# Patient Record
Sex: MALE | Race: WHITE
[De-identification: names, ages, dates, MRNs, and addresses within clinical notes are randomized per-mention and may not be internally consistent; named-entity substitution may affect disease eponyms.]

---

## 2018-06-03 ENCOUNTER — HOSPITAL ENCOUNTER (EMERGENCY)
Dept: HOSPITAL 17 - PHED | Age: 33
Discharge: HOME | End: 2018-06-03
Payer: COMMERCIAL

## 2018-06-03 VITALS
RESPIRATION RATE: 16 BRPM | OXYGEN SATURATION: 100 % | HEART RATE: 72 BPM | DIASTOLIC BLOOD PRESSURE: 86 MMHG | SYSTOLIC BLOOD PRESSURE: 114 MMHG

## 2018-06-03 VITALS — WEIGHT: 143.08 LBS | HEIGHT: 71 IN | BODY MASS INDEX: 20.03 KG/M2

## 2018-06-03 VITALS
HEART RATE: 93 BPM | RESPIRATION RATE: 18 BRPM | SYSTOLIC BLOOD PRESSURE: 169 MMHG | DIASTOLIC BLOOD PRESSURE: 81 MMHG | OXYGEN SATURATION: 100 % | TEMPERATURE: 97.1 F

## 2018-06-03 VITALS
DIASTOLIC BLOOD PRESSURE: 74 MMHG | SYSTOLIC BLOOD PRESSURE: 93 MMHG | OXYGEN SATURATION: 100 % | RESPIRATION RATE: 18 BRPM | HEART RATE: 72 BPM

## 2018-06-03 DIAGNOSIS — Z88.8: ICD-10-CM

## 2018-06-03 DIAGNOSIS — Z88.2: ICD-10-CM

## 2018-06-03 DIAGNOSIS — R10.13: Primary | ICD-10-CM

## 2018-06-03 LAB
ALBUMIN SERPL-MCNC: 4.5 GM/DL (ref 3.4–5)
ALP SERPL-CCNC: 70 U/L (ref 45–117)
ALT SERPL-CCNC: 29 U/L (ref 12–78)
AST SERPL-CCNC: 21 U/L (ref 15–37)
BASOPHILS # BLD AUTO: 0.1 TH/MM3 (ref 0–0.2)
BASOPHILS NFR BLD: 1 % (ref 0–2)
BILIRUB SERPL-MCNC: 2.2 MG/DL (ref 0.2–1)
BUN SERPL-MCNC: 21 MG/DL (ref 7–18)
CALCIUM SERPL-MCNC: 8.9 MG/DL (ref 8.5–10.1)
CHLORIDE SERPL-SCNC: 106 MEQ/L (ref 98–107)
CREAT SERPL-MCNC: 1.2 MG/DL (ref 0.6–1.3)
EOSINOPHIL # BLD: 0.3 TH/MM3 (ref 0–0.4)
EOSINOPHIL NFR BLD: 3.5 % (ref 0–4)
ERYTHROCYTE [DISTWIDTH] IN BLOOD BY AUTOMATED COUNT: 11.7 % (ref 11.6–17.2)
GFR SERPLBLD BASED ON 1.73 SQ M-ARVRAT: 69 ML/MIN (ref 89–?)
GLUCOSE SERPL-MCNC: 86 MG/DL (ref 74–106)
HCO3 BLD-SCNC: 23.3 MEQ/L (ref 21–32)
HCT VFR BLD CALC: 49.8 % (ref 39–51)
HGB BLD-MCNC: 17.2 GM/DL (ref 13–17)
LYMPHOCYTES # BLD AUTO: 1.4 TH/MM3 (ref 1–4.8)
LYMPHOCYTES NFR BLD AUTO: 14.4 % (ref 9–44)
MCH RBC QN AUTO: 31.8 PG (ref 27–34)
MCHC RBC AUTO-ENTMCNC: 34.5 % (ref 32–36)
MCV RBC AUTO: 92.1 FL (ref 80–100)
MONOCYTE #: 0.8 TH/MM3 (ref 0–0.9)
MONOCYTES NFR BLD: 7.7 % (ref 0–8)
NEUTROPHILS # BLD AUTO: 7.2 TH/MM3 (ref 1.8–7.7)
NEUTROPHILS NFR BLD AUTO: 73.4 % (ref 16–70)
PLATELET # BLD: 234 TH/MM3 (ref 150–450)
PMV BLD AUTO: 9 FL (ref 7–11)
PROT SERPL-MCNC: 8.2 GM/DL (ref 6.4–8.2)
RBC # BLD AUTO: 5.41 MIL/MM3 (ref 4.5–5.9)
SODIUM SERPL-SCNC: 138 MEQ/L (ref 136–145)
WBC # BLD AUTO: 9.8 TH/MM3 (ref 4–11)

## 2018-06-03 PROCEDURE — 80053 COMPREHEN METABOLIC PANEL: CPT

## 2018-06-03 PROCEDURE — 99284 EMERGENCY DEPT VISIT MOD MDM: CPT

## 2018-06-03 PROCEDURE — 83690 ASSAY OF LIPASE: CPT

## 2018-06-03 PROCEDURE — 85025 COMPLETE CBC W/AUTO DIFF WBC: CPT

## 2018-06-03 PROCEDURE — 74019 RADEX ABDOMEN 2 VIEWS: CPT

## 2018-06-03 RX ADMIN — ALUMINUM HYDROXIDE, MAGNESIUM HYDROXIDE, AND SIMETHICONE ONE ML: 200; 200; 20 SUSPENSION ORAL at 16:24

## 2018-06-03 RX ADMIN — LIDOCAINE HYDROCHLORIDE ONE ML: 20 SOLUTION ORAL; TOPICAL at 14:00

## 2018-06-03 RX ADMIN — ALUMINUM HYDROXIDE, MAGNESIUM HYDROXIDE, AND SIMETHICONE ONE ML: 200; 200; 20 SUSPENSION ORAL at 14:00

## 2018-06-03 RX ADMIN — LIDOCAINE HYDROCHLORIDE ONE ML: 20 SOLUTION ORAL; TOPICAL at 16:23

## 2018-06-03 RX ADMIN — SUCRALFATE ONE GM: 1 SUSPENSION ORAL at 14:00

## 2018-06-03 RX ADMIN — SUCRALFATE ONE GM: 1 SUSPENSION ORAL at 16:24

## 2018-06-03 NOTE — RADRPT
EXAM DATE:  6/3/2018 3:19 PM EDT

AGE/SEX:        32 years / Male



INDICATIONS:  Severe abdomen pain



CLINICAL DATA:  This is the patient's initial encounter. Patient reports that signs and symptoms have
 been present for 3 days and indicates a pain score of 9/10. 

                                                                          

MEDICAL/SURGICAL HISTORY:       None. None.



COMPARISON:      No prior [facility] exams available for comparison.



FINDINGS:  

Mild to moderate small bowel distention present, especially jejunal loops in the left upper quadrant.
 No free air demonstrated. No significant colonic or gastric distention demonstrated.



Patient has had previous cholecystectomy.



CONCLUSION: 

Suspected early or partial small bowel obstruction. No free air.



Electronically signed by: Nathan Garcia MD  6/3/2018 3:22 PM EDT

## 2018-06-03 NOTE — PD
HPI


Chief Complaint:  GI Complaint


Time Seen by Provider:  13:41


Travel History


International Travel<30 days:  No


Contact w/Intl Traveler<30days:  No


Traveled to known affect area:  No





History of Present Illness


HPI


Patient comes in complaining of an ongoing symptom that has been going for 

approximately a year and a half.  He has not been referred or seen a GI doctor 

yet.  He is having the stool test for H. pylori done by his primary care up in 

[place].  He is here because his epigastric area pain feels much worse than usual

, he is now a 7 out of 10, associated with nausea but without any vomiting or 

diarrhea.  He is epigastric area pain is not radiating, does not appear to 

improve anymore when eating, but at some point during the past year or so he 

used to.  Patient is here also wondering if he could get urea breath test to 

check for H. pylori, I advised the patient that as an outpatient testing.  No 

alleviating or aggravating factors








Allergy to quinolones and sulfa


Past medical history significant for suspected peptic ulcers and cholecystectomy





PFSH


Past Medical History


Gastrointestinal Disorders:  Yes


Ulcer:  Yes (POSSIBLE)


Influenza Vaccination:  No





Past Surgical History


Cholecystectomy:  Yes





Social History


Alcohol Use:  No


Tobacco Use:  No


Substance Use:  No





Allergies-Medications


(Allergen,Severity, Reaction):  


Coded Allergies:  


     Quinolones (Verified  Allergy, Severe, Dizziness, 6/3/18)


     Sulfa (Sulfonamide Antibiotics) (Verified  Allergy, Severe, Dizziness, 6/3/

18)


Reported Meds & Prescriptions





Reported Meds & Active Scripts


Active


Carafate (Sucralfate) 1 Gram Tab 1 Gm PO TID


     On empty stomach


Reglan (Metoclopramide HCl) 10 Mg Tab 10 Mg PO QID 5 Days








Review of Systems


Except as stated in HPI:  all other systems reviewed are Neg





Data


Data


Last Documented VS





Vital Signs








  Date Time  Temp Pulse Resp B/P (MAP) Pulse Ox O2 Delivery O2 Flow Rate FiO2


 


6/3/18 15:48  72 16 114/86 (95) 100 Room Air  


 


6/3/18 12:27 97.1       








Orders





 Orders


Complete Blood Count With Diff (6/3/18 13:58)


Comprehensive Metabolic Panel (6/3/18 13:58)


Lipase (6/3/18 13:58)


Abdomen, Flat & Upright (6/3/18 )


Iv Access Insert/Monitor (6/3/18 13:58)


Al-Mag Hy-Si 40-40-4 Mg/Ml Liq (Mag-Al P (6/3/18 14:00)


Lidocaine 2% Viscous (Xylocaine 2% Visco (6/3/18 14:00)


Sucralfate Liq (Carafate Liq) (6/3/18 14:00)


Ed Discharge Order (6/3/18 16:39)





Labs





Laboratory Tests








Test


  6/3/18


14:05


 


White Blood Count 9.8 TH/MM3 


 


Red Blood Count 5.41 MIL/MM3 


 


Hemoglobin 17.2 GM/DL 


 


Hematocrit 49.8 % 


 


Mean Corpuscular Volume 92.1 FL 


 


Mean Corpuscular Hemoglobin 31.8 PG 


 


Mean Corpuscular Hemoglobin


Concent 34.5 % 


 


 


Red Cell Distribution Width 11.7 % 


 


Platelet Count 234 TH/MM3 


 


Mean Platelet Volume 9.0 FL 


 


Neutrophils (%) (Auto) 73.4 % 


 


Lymphocytes (%) (Auto) 14.4 % 


 


Monocytes (%) (Auto) 7.7 % 


 


Eosinophils (%) (Auto) 3.5 % 


 


Basophils (%) (Auto) 1.0 % 


 


Neutrophils # (Auto) 7.2 TH/MM3 


 


Lymphocytes # (Auto) 1.4 TH/MM3 


 


Monocytes # (Auto) 0.8 TH/MM3 


 


Eosinophils # (Auto) 0.3 TH/MM3 


 


Basophils # (Auto) 0.1 TH/MM3 


 


CBC Comment DIFF FINAL 


 


Differential Comment  


 


Blood Urea Nitrogen 21 MG/DL 


 


Creatinine 1.20 MG/DL 


 


Random Glucose 86 MG/DL 


 


Total Protein 8.2 GM/DL 


 


Albumin 4.5 GM/DL 


 


Calcium Level 8.9 MG/DL 


 


Alkaline Phosphatase 70 U/L 


 


Aspartate Amino Transf


(AST/SGOT) 21 U/L 


 


 


Alanine Aminotransferase


(ALT/SGPT) 29 U/L 


 


 


Total Bilirubin 2.2 MG/DL 


 


Sodium Level 138 MEQ/L 


 


Potassium Level 4.0 MEQ/L 


 


Chloride Level 106 MEQ/L 


 


Carbon Dioxide Level 23.3 MEQ/L 


 


Anion Gap 9 MEQ/L 


 


Estimat Glomerular Filtration


Rate 69 ML/MIN 


 


 


Lipase 171 U/L 











[facility]


Medical Decision Making


Medical Screen Exam Complete:  Yes


Emergency Medical Condition:  Yes


Medical Record Reviewed:  Yes


Differential Diagnosis


Perforated ulcer versus small bowel obstruction versus ileus versus 

pancreatitis versus hepatitis versus electrolyte abnormalities versus 

dehydration


Narrative Course


CBC showed no leukocytosis, some hemoconcentration with a hemoglobin of 17, 

normal platelet count, and no left shift


Elect lites are all within normal limits, normal kidney liver and pancreatic 

functions.





The patient's abdominal x-rays show a suspected early ileus is noted without 

any free air





This was discussed with patient, who requested a trial of outpatient therapy.  

Declined admission for observation at this present time





Diagnosis





 Primary Impression:  


 Ileus


Patient Instructions:  Full Liquid Diet (DC), General Instructions, Ileus (ED)


Scripts


Sucralfate (Carafate) 1 Gram Tab


1 GM PO TID for Ulcer Prevention, #90 TAB 0 Refills


   On empty stomach


   Prov: Steve Smith MD         6/3/18 


Metoclopramide (Reglan) 10 Mg Tab


10 MG PO QID for 5 Days, #30 TAB 0 Refills


   Prov: Steve Smith MD         6/3/18


Disposition:  01 DISCHARGE HOME


Condition:  Stable











Steve Smith MD Sukhi 3, 2018 13:44